# Patient Record
Sex: MALE | Race: WHITE | NOT HISPANIC OR LATINO
[De-identification: names, ages, dates, MRNs, and addresses within clinical notes are randomized per-mention and may not be internally consistent; named-entity substitution may affect disease eponyms.]

---

## 2022-09-13 PROBLEM — Z00.00 ENCOUNTER FOR PREVENTIVE HEALTH EXAMINATION: Status: ACTIVE | Noted: 2022-09-13

## 2022-09-30 ENCOUNTER — APPOINTMENT (OUTPATIENT)
Dept: ORTHOPEDIC SURGERY | Facility: CLINIC | Age: 67
End: 2022-09-30

## 2022-09-30 DIAGNOSIS — M79.672 PAIN IN RIGHT FOOT: ICD-10-CM

## 2022-09-30 DIAGNOSIS — M79.671 PAIN IN RIGHT FOOT: ICD-10-CM

## 2022-09-30 DIAGNOSIS — M19.071 PRIMARY OSTEOARTHRITIS, RIGHT ANKLE AND FOOT: ICD-10-CM

## 2022-09-30 DIAGNOSIS — M79.672 PAIN IN LEFT FOOT: ICD-10-CM

## 2022-09-30 DIAGNOSIS — M17.11 UNILATERAL PRIMARY OSTEOARTHRITIS, RIGHT KNEE: ICD-10-CM

## 2022-09-30 DIAGNOSIS — M25.561 PAIN IN RIGHT KNEE: ICD-10-CM

## 2022-09-30 DIAGNOSIS — M25.562 PAIN IN LEFT KNEE: ICD-10-CM

## 2022-09-30 PROCEDURE — 99204 OFFICE O/P NEW MOD 45 MIN: CPT | Mod: 25

## 2022-09-30 PROCEDURE — 73610 X-RAY EXAM OF ANKLE: CPT | Mod: LT

## 2022-09-30 PROCEDURE — 73564 X-RAY EXAM KNEE 4 OR MORE: CPT | Mod: LT

## 2022-09-30 PROCEDURE — J3490M: CUSTOM

## 2022-09-30 PROCEDURE — 99214 OFFICE O/P EST MOD 30 MIN: CPT | Mod: 25

## 2022-09-30 PROCEDURE — 73630 X-RAY EXAM OF FOOT: CPT | Mod: LT

## 2022-09-30 PROCEDURE — 20600 DRAIN/INJ JOINT/BURSA W/O US: CPT | Mod: 50

## 2022-09-30 NOTE — PROCEDURE
[2nd] : 2nd [3rd] : 3rd [Tarsometatarsal joint] : tarsometatarsal joint [de-identified] : Right 2nd and 3rd TMT joint injections was performed. The indication for this injection was pain and inflammation. The site was prepped with alcohol, betadine, and ethyl chloride was sprayed topically and sterile technique was used. An injection of Bupivicaine (Marcaine) 1cc of 0.25%, and Triamcinolone (Kenalog) 1cc of 40mg was used. The patient tolerated the injection well without any complications. Patient was advised to call if redness, pain or fever occur, apply ice for 15 minutes out of every hours for the first 12-24 hours as tolerated and patient advised to rest the joint for 2-3 days.

## 2022-09-30 NOTE — HISTORY OF PRESENT ILLNESS
[Dull/Aching] : dull/aching [Intermittent] : intermittent [Meds] : meds [Standing] : standing [Walking] : walking [de-identified] : Mr. VAUGHN is a 66 year male who presents today for evaluation of their bilateral foot pain with the right significantly worse than the left after a traumatic injury of a elevator accident with subsequent development of midfoot arthritis which has been treated conservatively.  He has had a previous cortisone injection into his right foot over close to a year ago.  He does work and requires standing and walking throughout the day for 10 to 14 hours.  He has been noticing increasing pain in his right foot more along the top and inside aspect of the foot.  He does not recall any new injury to his ankles or feet.  He has been wearing good supportive shoe wear.  He has also started to develop pain in the left knee more on the inside aspect of his knee with some mild swelling especially after a slight slip on a wet surface while at work 10 days ago.  He does not notice any catching or locking or giving out of the left knee. [] : no [FreeTextEntry1] : bilateral feet  [FreeTextEntry5] : pt states right>left pain. pain has increased since seeing Dr. Barrett in [place] earlier this year (2022).  [de-identified] : February 2022

## 2022-09-30 NOTE — IMAGING
[de-identified] : General: Well-nourished, well developed, in no apparent distress\par Psych: Clear speech, pleasant mood and affect\par Neurological: Alert and oriented to person, place, and time\par Gait: Normal\par Respiratory: Normal respiratory effort\par Skin: No rashes, no lesions, no open skin, no ecchymosis, no erythema\par \par \par Inspection: No swelling, ecchymosis or redness noted.\par \par Alignment: Hindfoot alignment in anatomic valgus, neutral midfoot alignment.\par  \par Palpation: No anterior medial, central or lateral ankle joint line tenderness. No posterior medial or lateral ankle pain. No pain over proximal, midshaft or distal tibia or fibula. Leg Compartments are soft and nontender. Calf is soft and non-tender with a down-going De La Cruz test. No pain over the lateral and medial malleolus. No pain over ATFL and CFL. Non-tender over the deltoid ligament or proximal and distal syndesmosis. Negative squeeze test of the syndesmosis. Non-tender over the fibula head or neck. Non-tender over the sinus tarsi.\par No pain over the course of the achilles, peroneal, posterior tibial, anterior tibial or the extensor tendons. \par On examination of the foot: Foot compartments are soft and nontender. No pain over the heel or plantar fascia. No tenderness over the transverse tarsal joints, fourth or fifth TMT or the Lisfranc joints on palpation and stress examination.  He is tender with palpable dorsal osteophytes over the first second and third TMT joints both dorsally and plantarly.  No tenderness over the navicular, cuboid, cuneiforms, or metatarsals shafts. No pain beneath the metatarsal heads. Full range of motion through the MTP joints. The toes are reduced and well aligned. No pain on examination of the toes, and no webspace tenderness noted\par \par ROM: 15-20 degrees of dorsiflexion, 40 degrees of plantar flexion, 20 degrees of inversion and 20 degrees of eversion without pain. Able to flex and extend all toes without pain \par \par Muscle Strength: 5/5 strength with resisted dorsiflexion, plantar flexion, inversion and eversion without any pain.\par \par Stability: No instability or laxity noted with varus/valgus stress. Negative anterior and posterior drawer test. No pain with dorsiflexion external rotation stress test.\par \par Neurologic Exam : Sensation is grossly intact bilateral upper and lower extremities to light touch throughout. Sensation intact to the sural, posterior tibial, superficial peroneal nerve. 2+ patellar tendon and Achilles tendon reflexes are noted. There is a downgoing Babinski test. No clonus is noted bilaterally.\par \par Vascular: Arterial Pulses right and Left: posterior tibialis normal and dorsalis pedis normal. Right and Left: no edema, no varicosities and brisk capillary refill test normal.\par \par Radiographs: X-rays done today in the office 3 views of the bilateral ankle and foot without any limitations which reveal: No acute fractures or dislocations seen. The ankle mortise and syndesmosis are reduced and well aligned. There is no widening of the syndesmosis. No evidence of an osteochondral defect. No fractures of the lateral process of the talus or of the anterior process of the calcaneus noted. The midfoot and forefoot joints are reduced with plantar gapping and mild posterior subluxation of the first TMT joint bilaterally, with significant arthrosis with joint space narrowing of the first second and third TMT joints with dorsal and medial osteophytes noted.  As well as arthrosis of the naviculocuneiform joints\par \par \par \par \par On examination of the knee: Knee alignment is in slight valgus. There is mild swelling along the anterior aspect of the knee joint with a mild effusion noted. No erythema induration or warmth is noted. There is no pain over the proximal midshaft or distal femur. No pain over the distal femoral condyles. No pain over the lateral knee joint line.  He is tender over the medial knee joint line and mildly tender over the MCL.  No pain over the LCL or tibial plateau. No pain over the fibular head. No pain over the quadriceps tendon, patellar, patellar tendon or tibial tubercle. No pain over the patellar retinaculum medial or laterally. The patella appears to be tracking well. Negative patellar apprehension test. No pain or mass over the popliteal fossa. There is good range of motion of the knee with flexion of 130 degrees of full extension with 5/5 strength of the quadriceps and hamstrings. No instability on varus valgus stress and negative anterior and posterior drawer testing. There is no pain over the proximal midshaft or distal tibia or fibula. The calf is soft and nontender with a downgoing De La Cruz test. There is full dorsiflexion plantarflexion inversion and eversion of the ankle and hindfoot with 5/5 strength throughout muscle groups. No pain over the midfoot or forefoot. Sensation is grossly intact throughout to light touch Down Babinski test. 2+ patellar tendon and Achilles tendon reflexes and palpable dorsalis pedis and posterior tibial pulses.\par X-rays done today in the office 3 views of the knee which reveal no acute fractures or dislocations the knee joint is reduced and well aligned and the patella appears to be tracking well, some joint space narrowing more involved the medial compartment and patellofemoral compartments.